# Patient Record
Sex: MALE | URBAN - METROPOLITAN AREA
[De-identification: names, ages, dates, MRNs, and addresses within clinical notes are randomized per-mention and may not be internally consistent; named-entity substitution may affect disease eponyms.]

---

## 2018-01-29 ENCOUNTER — RECORDS - HEALTHEAST (OUTPATIENT)
Dept: LAB | Facility: CLINIC | Age: 43
End: 2018-01-29

## 2018-02-01 LAB
QTF INTERPRETATION: NORMAL
QTF MITOGEN - NIL: >10 IU/ML
QTF NIL: 0.01 IU/ML
QTF RESULT: NEGATIVE
QTF TB ANTIGEN - NIL: 0 IU/ML

## 2021-08-23 ENCOUNTER — OFFICE VISIT (OUTPATIENT)
Dept: FAMILY MEDICINE | Facility: OTHER | Age: 46
End: 2021-08-23
Attending: PHYSICIAN ASSISTANT
Payer: COMMERCIAL

## 2021-08-23 VITALS
OXYGEN SATURATION: 95 % | TEMPERATURE: 97.3 F | RESPIRATION RATE: 20 BRPM | DIASTOLIC BLOOD PRESSURE: 70 MMHG | BODY MASS INDEX: 31.32 KG/M2 | HEIGHT: 65 IN | SYSTOLIC BLOOD PRESSURE: 132 MMHG | WEIGHT: 188 LBS | HEART RATE: 105 BPM

## 2021-08-23 DIAGNOSIS — Z11.3 SCREEN FOR STD (SEXUALLY TRANSMITTED DISEASE): Primary | ICD-10-CM

## 2021-08-23 LAB
C TRACH DNA SPEC QL PROBE+SIG AMP: NEGATIVE
N GONORRHOEA DNA SPEC QL NAA+PROBE: NEGATIVE

## 2021-08-23 PROCEDURE — 87522 HEPATITIS C REVRS TRNSCRPJ: CPT | Mod: ZL | Performed by: PHYSICIAN ASSISTANT

## 2021-08-23 PROCEDURE — 87389 HIV-1 AG W/HIV-1&-2 AB AG IA: CPT | Mod: ZL | Performed by: PHYSICIAN ASSISTANT

## 2021-08-23 PROCEDURE — 87491 CHLMYD TRACH DNA AMP PROBE: CPT | Mod: ZL | Performed by: PHYSICIAN ASSISTANT

## 2021-08-23 PROCEDURE — 99203 OFFICE O/P NEW LOW 30 MIN: CPT | Performed by: PHYSICIAN ASSISTANT

## 2021-08-23 PROCEDURE — 86780 TREPONEMA PALLIDUM: CPT | Mod: ZL | Performed by: PHYSICIAN ASSISTANT

## 2021-08-23 PROCEDURE — 36415 COLL VENOUS BLD VENIPUNCTURE: CPT | Mod: ZL | Performed by: PHYSICIAN ASSISTANT

## 2021-08-23 ASSESSMENT — PAIN SCALES - GENERAL: PAINLEVEL: NO PAIN (0)

## 2021-08-23 ASSESSMENT — MIFFLIN-ST. JEOR: SCORE: 1664.64

## 2021-08-23 NOTE — PROGRESS NOTES
ASSESSMENT/PLAN:    I have reviewed the nursing notes.  I have reviewed the findings, diagnosis, plan and need for follow up with the patient.    1. Screen for STD (sexually transmitted disease)  - GC/Chlamydia by PCR  - HIV Antigen Antibody Combo; Future  - Hepatitis C RNA, Quantitative; Future  - Treponema Ab w Reflex to RPR and Titer; Future  - HIV Antigen Antibody Combo  - Hepatitis C RNA, Quantitative  - Treponema Ab w Reflex to RPR and Titer    STD testing ordered due to partner positive for syphilis and treated. Patient is currently asymptomatic for STD. Discussed with patient that STDs/STIs can present asymptomatically, discussed that lab work takes up to 3-4 days to return (blood testing). He should abstain from intercourse until results return, if positive, he will treated accordingly (for GC, syphilis only) and should refrain from intercourse as directed. If he develops fevers, chills, worrisome signs or symptoms he is agreeable to return for reevaluation. Patient is in agreement and understanding of the above treatment plan. All questions and concerns were addressed and answered to patient's satisfaction. AVS reviewed with patient.     Discussed warning signs/symptoms indicative of need to f/u    Follow up if symptoms persist or worsen or concerns    I explained my diagnostic considerations and recommendations to the patient, who voiced understanding and agreement with the treatment plan. All questions were answered. We discussed potential side effects of any prescribed or recommended therapies, as well as expectations for response to treatments.    Yumiko Joseph PA-C  8/23/2021  3:44 PM    HPI:    Jameel Thorne is a 45 year old male  who presents to [facility] today for concerns of STD check, partner tested positive and treated for syphilis. Denies fevers, chills, testicular or scrotal swelling, discharge. No dysuria, hematuria. No sores. No urinary symptoms. No other pertinent symptoms. He would  "like to be checked for all STD today. No history of STDs.     NKDA    PCP: None    History reviewed. No pertinent past medical history.  History reviewed. No pertinent surgical history.  Social History     Tobacco Use     Smoking status: Never Smoker     Smokeless tobacco: Never Used   Substance Use Topics     Alcohol use: Not Currently     No current outpatient medications on file.     No Known Allergies  Past medical history, past surgical history, current medications and allergies reviewed and accurate to the best of my knowledge.      ROS:  Refer to HPI    /70   Pulse 105   Temp 97.3  F (36.3  C)   Resp 20   Ht 1.651 m (5' 5\")   Wt 85.3 kg (188 lb)   SpO2 95%   BMI 31.28 kg/m      EXAM:  General Appearance: Well appearing 45-year old male, appropriate appearance for age. No acute distress  Respiratory: normal chest wall and respirations.  Normal effort.  Clear to auscultation bilaterally, no wheezing, crackles or rhonchi.  No increased work of breathing.  No cough appreciated.  Cardiac: RRR with no murmurs  Abdomen: soft, nontender, no rigidity, no rebound tenderness or guarding, normal bowel sounds present  :  No suprapubic tenderness to palpation.  No CVA tenderness to palpation.    Musculoskeletal:  Equal movement of bilateral upper extremities.  Equal movement of bilateral lower extremities.  Normal gait.    Dermatological: no rashes noted of exposed skin  Psychological: normal affect, alert, oriented, and pleasant.     Labs:  G/C: pending  Syphilis, HIV and Hep C all in process.     Xray:  None     "

## 2021-08-23 NOTE — PATIENT INSTRUCTIONS
You were seen in [facility] for a STD testing:  Exposure Testing (While you were here likely included the following):  -Syphilis  -Hep C Anti  -HIV Antigen  -HIV Antibody  -Gonorrhea/Chlamydia    -We will call you in regards to results - some results vary between 90 minute turn around time, to 3-5 days for other blood STD testing

## 2021-08-23 NOTE — NURSING NOTE
Patient here for STD testing. Medication Reconciliation: complete.    Ade Webber LPN  8/23/2021 3:38 PM

## 2021-08-24 LAB
HIV 1+2 AB+HIV1 P24 AG SERPL QL IA: NONREACTIVE
T PALLIDUM AB SER QL: NONREACTIVE

## 2021-08-25 LAB — HCV RNA SERPL NAA+PROBE-ACNC: NOT DETECTED IU/ML
